# Patient Record
Sex: FEMALE | Race: WHITE
[De-identification: names, ages, dates, MRNs, and addresses within clinical notes are randomized per-mention and may not be internally consistent; named-entity substitution may affect disease eponyms.]

---

## 2017-10-19 ENCOUNTER — HOSPITAL ENCOUNTER (EMERGENCY)
Dept: HOSPITAL 92 - ERS | Age: 82
Discharge: HOME | End: 2017-10-19
Payer: MEDICARE

## 2017-10-19 DIAGNOSIS — Z79.891: ICD-10-CM

## 2017-10-19 DIAGNOSIS — M54.2: Primary | ICD-10-CM

## 2017-10-19 DIAGNOSIS — F32.9: ICD-10-CM

## 2017-10-19 DIAGNOSIS — E78.5: ICD-10-CM

## 2017-10-19 DIAGNOSIS — Z79.899: ICD-10-CM

## 2017-10-19 DIAGNOSIS — F41.9: ICD-10-CM

## 2017-10-19 DIAGNOSIS — Z79.82: ICD-10-CM

## 2017-10-19 DIAGNOSIS — I10: ICD-10-CM

## 2017-10-19 DIAGNOSIS — E03.9: ICD-10-CM

## 2017-10-19 DIAGNOSIS — G89.29: ICD-10-CM

## 2017-10-19 DIAGNOSIS — M54.5: ICD-10-CM

## 2017-10-19 PROCEDURE — 96375 TX/PRO/DX INJ NEW DRUG ADDON: CPT

## 2017-10-19 PROCEDURE — 96374 THER/PROPH/DIAG INJ IV PUSH: CPT

## 2017-10-19 PROCEDURE — 72131 CT LUMBAR SPINE W/O DYE: CPT

## 2017-10-19 PROCEDURE — 72125 CT NECK SPINE W/O DYE: CPT

## 2017-10-19 NOTE — CT
CT OF THE CERVICAL SPINE:

 

DATE: 10/19/17.

 

PROVIDED CLINICAL HISTORY: 

Acute on chronic neck pain.

 

FINDINGS: 

There is no evidence for a fracture or traumatic subluxation.  Advanced multilevel cervical degenera
tive changes are seen with areas of foraminal narrowing up to severe in degree.  Potentially severe 
central canal stenosis at C6-7 is noted.  No prevertebral soft tissue swelling apparent.  The visual
ized lung apices are free of significant opacity.

 

No concerning lytic or blastic bone lesions are seen.

 

IMPRESSION: 

1.  No evidence for a fracture or traumatic subluxation.

2.  Advanced multilevel cervical degenerative change.

 

POS: [facility]

## 2017-10-19 NOTE — CT
CT OF THE LUMBAR SPINE WITHOUT CONTRAST:

 

Date:  10/19/17 

 

COMPARISON:  

None. 

 

HISTORY:  

Lumbar spine. Prior spinal surgery. 

 

TECHNIQUE:  

Multiple contiguous axial images were obtained in a CT of the lumbar spine without contrast. Sagitta
l and coronal reformats were performed. 

 

FINDINGS:

The intervertebral discs are narrowed throughout the lumbar spine. Vacuum phenomenon is seen at mult
iple levels. The vertebral bodies demonstrate normal height without fracture or subluxation. The pat
ient is status posterior fusion of L4 through S1 with bilateral pedicle screws. No perihardware luce
ncy is seen surrounding the hardware. 

 

Atherosclerotic calcifications are seen in the aorta. The other prevertebral soft tissues are unrema
rkable. 

 

T12-L1: 

A small disc osteophyte complex is seen. Mild bilateral posterior disc arthrosis. No bony narrowing 
of the neural foramina or central canal. 

 

L1-2: 

A small disc osteophyte complex is seen. No posterior facet arthrosis. No bony narrowing of the cent
ral canal. Moderate bony narrowing of the bilateral neural foramina. 

 

L2-3: 

A moderate disc osteophyte complex is seen. Mild bilateral posterior facet arthrosis. No bony narrow
ing of the central canal. Mild right and moderate to severe left neural foraminal stenosis. 

 

L3-4: 

A small disc osteophyte complex is seen. Moderate bilateral posterior facet arthrosis. Mild bony dianna
rowing of the central canal. There is mild right neural foraminal stenosis. No left neural foraminal
 stenosis. 

 

L4-5: 

This level is fused. Moderate bilateral posterior facet arthrosis. No bony narrowing of the central 
canal. Moderate bilateral neural foraminal stenosis. 

 

L5-S1: 

A small disc osteophyte complex is seen. Moderate bilateral posterior facet arthrosis. This level is
 fused. No bony narrowing of the central canal. Moderate bilateral neural foraminal stenosis. 

 

IMPRESSION: 

Degenerative changes of the lumbar spine as above. 

 

 

POS: [facility]

## 2017-12-11 ENCOUNTER — HOSPITAL ENCOUNTER (EMERGENCY)
Dept: HOSPITAL 92 - ERS | Age: 82
LOS: 1 days | Discharge: PSYCHIATRIC OR SUBSTANCE USE FACILITY | End: 2017-12-12
Payer: MEDICARE

## 2017-12-11 DIAGNOSIS — R45.851: ICD-10-CM

## 2017-12-11 DIAGNOSIS — F32.9: ICD-10-CM

## 2017-12-11 DIAGNOSIS — Z79.82: ICD-10-CM

## 2017-12-11 DIAGNOSIS — E78.5: ICD-10-CM

## 2017-12-11 DIAGNOSIS — F41.9: Primary | ICD-10-CM

## 2017-12-11 DIAGNOSIS — I10: ICD-10-CM

## 2017-12-11 DIAGNOSIS — E03.9: ICD-10-CM

## 2017-12-11 DIAGNOSIS — Z79.899: ICD-10-CM

## 2017-12-11 DIAGNOSIS — Z86.711: ICD-10-CM

## 2017-12-11 LAB
ALP SERPL-CCNC: 86 U/L (ref 40–150)
ALT SERPL W P-5'-P-CCNC: 12 U/L (ref 8–55)
ANION GAP SERPL CALC-SCNC: 14 MMOL/L (ref 10–20)
APAP SERPL-MCNC: (no result) MCG/ML (ref 10–30)
AST SERPL-CCNC: 15 U/L (ref 5–34)
BACTERIA UR QL AUTO: (no result) HPF
BASOPHILS # BLD AUTO: 0.1 THOU/UL (ref 0–0.2)
BASOPHILS NFR BLD AUTO: 0.8 % (ref 0–1)
BILIRUB SERPL-MCNC: 0.5 MG/DL (ref 0.2–1.2)
BUN SERPL-MCNC: 18 MG/DL (ref 9.8–20.1)
CALCIUM SERPL-MCNC: 9.6 MG/DL (ref 7.8–10.44)
CHLORIDE SERPL-SCNC: 101 MMOL/L (ref 98–107)
CO2 SERPL-SCNC: 26 MMOL/L (ref 23–31)
CREAT CL PREDICTED SERPL C-G-VRATE: 0 ML/MIN (ref 70–130)
EOSINOPHIL # BLD AUTO: 0.2 THOU/UL (ref 0–0.7)
EOSINOPHIL NFR BLD AUTO: 1.8 % (ref 0–10)
GLOBULIN SER CALC-MCNC: 2.9 G/DL (ref 2.4–3.5)
HCT VFR BLD CALC: 40.8 % (ref 36–47)
HYALINE CASTS #/AREA URNS LPF: (no result) LPF
LYMPHOCYTES # BLD: 2.5 THOU/UL (ref 1.2–3.4)
LYMPHOCYTES NFR BLD AUTO: 22.4 % (ref 21–51)
MONOCYTES # BLD AUTO: 0.7 THOU/UL (ref 0.11–0.59)
MONOCYTES NFR BLD AUTO: 6.6 % (ref 0–10)
NEUTROPHILS # BLD AUTO: 7.7 THOU/UL (ref 1.4–6.5)
RBC # BLD AUTO: 4.7 MILL/UL (ref 4.2–5.4)
RBC UR QL AUTO: (no result) HPF (ref 0–3)
SALICYLATES SERPL-MCNC: (no result) MG/DL (ref 15–30)
WBC # BLD AUTO: 11.2 THOU/UL (ref 4.8–10.8)
WBC UR QL AUTO: (no result) HPF (ref 0–3)

## 2017-12-11 PROCEDURE — 81015 MICROSCOPIC EXAM OF URINE: CPT

## 2017-12-11 PROCEDURE — 80307 DRUG TEST PRSMV CHEM ANLYZR: CPT

## 2017-12-11 PROCEDURE — 80306 DRUG TEST PRSMV INSTRMNT: CPT

## 2017-12-11 PROCEDURE — 84443 ASSAY THYROID STIM HORMONE: CPT

## 2017-12-11 PROCEDURE — 93005 ELECTROCARDIOGRAM TRACING: CPT

## 2017-12-11 PROCEDURE — 36415 COLL VENOUS BLD VENIPUNCTURE: CPT

## 2017-12-11 PROCEDURE — 87086 URINE CULTURE/COLONY COUNT: CPT

## 2017-12-11 PROCEDURE — 82550 ASSAY OF CK (CPK): CPT

## 2017-12-11 PROCEDURE — 81003 URINALYSIS AUTO W/O SCOPE: CPT

## 2017-12-11 PROCEDURE — 85025 COMPLETE CBC W/AUTO DIFF WBC: CPT

## 2017-12-11 PROCEDURE — 80053 COMPREHEN METABOLIC PANEL: CPT

## 2017-12-12 NOTE — EKG
Test Reason : 

Blood Pressure : ***/*** mmHG

Vent. Rate : 071 BPM     Atrial Rate : 071 BPM

   P-R Int : 160 ms          QRS Dur : 096 ms

    QT Int : 390 ms       P-R-T Axes : 064 -46 126 degrees

   QTc Int : 423 ms

 

Normal sinus rhythm

Left anterior fascicular block

Nonspecific T wave abnormality

Abnormal ECG

 

Confirmed by GAURANG CARRERA ([id number]),  GOPI EDMOND ([id number] on 12/12/2017 1:27:21 PM

 

Referred By:             Confirmed By:GAURANG CARRERA

## 2018-02-19 ENCOUNTER — HOSPITAL ENCOUNTER (EMERGENCY)
Dept: HOSPITAL 92 - ERS | Age: 83
Discharge: HOME | End: 2018-02-19
Payer: MEDICARE

## 2018-02-19 DIAGNOSIS — E03.9: ICD-10-CM

## 2018-02-19 DIAGNOSIS — R55: Primary | ICD-10-CM

## 2018-02-19 DIAGNOSIS — K21.9: ICD-10-CM

## 2018-02-19 DIAGNOSIS — Z79.899: ICD-10-CM

## 2018-02-19 DIAGNOSIS — E78.5: ICD-10-CM

## 2018-02-19 DIAGNOSIS — F32.9: ICD-10-CM

## 2018-02-19 DIAGNOSIS — I10: ICD-10-CM

## 2018-02-19 DIAGNOSIS — G89.29: ICD-10-CM

## 2018-02-19 DIAGNOSIS — F41.9: ICD-10-CM

## 2018-02-19 LAB
ALBUMIN SERPL BCG-MCNC: 4.1 G/DL (ref 3.4–4.8)
ALP SERPL-CCNC: 83 U/L (ref 40–150)
ALT SERPL W P-5'-P-CCNC: 10 U/L (ref 8–55)
ANION GAP SERPL CALC-SCNC: 12 MMOL/L (ref 10–20)
AST SERPL-CCNC: 14 U/L (ref 5–34)
BASOPHILS # BLD AUTO: 0.1 THOU/UL (ref 0–0.2)
BASOPHILS NFR BLD AUTO: 1 % (ref 0–1)
BILIRUB SERPL-MCNC: 0.3 MG/DL (ref 0.2–1.2)
BUN SERPL-MCNC: 15 MG/DL (ref 9.8–20.1)
CALCIUM SERPL-MCNC: 9.5 MG/DL (ref 7.8–10.44)
CHLORIDE SERPL-SCNC: 98 MMOL/L (ref 98–107)
CK MB SERPL-MCNC: 0.8 NG/ML (ref 0–6.6)
CK SERPL-CCNC: 39 U/L (ref 29–168)
CO2 SERPL-SCNC: 31 MMOL/L (ref 23–31)
CREAT CL PREDICTED SERPL C-G-VRATE: 0 ML/MIN (ref 70–130)
EOSINOPHIL # BLD AUTO: 0.2 THOU/UL (ref 0–0.7)
EOSINOPHIL NFR BLD AUTO: 2.3 % (ref 0–10)
GLOBULIN SER CALC-MCNC: 2.6 G/DL (ref 2.4–3.5)
GLUCOSE SERPL-MCNC: 147 MG/DL (ref 83–110)
HGB BLD-MCNC: 13.1 G/DL (ref 12–16)
LYMPHOCYTES # BLD: 2.5 THOU/UL (ref 1.2–3.4)
LYMPHOCYTES NFR BLD AUTO: 27.8 % (ref 21–51)
MCH RBC QN AUTO: 30.3 PG (ref 27–31)
MCV RBC AUTO: 93.6 FL (ref 81–99)
MONOCYTES # BLD AUTO: 1 THOU/UL (ref 0.11–0.59)
MONOCYTES NFR BLD AUTO: 11.8 % (ref 0–10)
NEUTROPHILS # BLD AUTO: 5 THOU/UL (ref 1.4–6.5)
NEUTROPHILS NFR BLD AUTO: 57.2 % (ref 42–75)
PLATELET # BLD AUTO: 253 THOU/UL (ref 130–400)
POTASSIUM SERPL-SCNC: 3.5 MMOL/L (ref 3.5–5.1)
RBC # BLD AUTO: 4.33 MILL/UL (ref 4.2–5.4)
SODIUM SERPL-SCNC: 137 MMOL/L (ref 136–145)
TROPONIN I SERPL DL<=0.01 NG/ML-MCNC: (no result) NG/ML (ref ?–0.03)
WBC # BLD AUTO: 8.8 THOU/UL (ref 4.8–10.8)

## 2018-02-19 PROCEDURE — 93005 ELECTROCARDIOGRAM TRACING: CPT

## 2018-02-19 PROCEDURE — 85025 COMPLETE CBC W/AUTO DIFF WBC: CPT

## 2018-02-19 PROCEDURE — 71275 CT ANGIOGRAPHY CHEST: CPT

## 2018-02-19 PROCEDURE — 94760 N-INVAS EAR/PLS OXIMETRY 1: CPT

## 2018-02-19 PROCEDURE — 84484 ASSAY OF TROPONIN QUANT: CPT

## 2018-02-19 PROCEDURE — 82553 CREATINE MB FRACTION: CPT

## 2018-02-19 PROCEDURE — 85379 FIBRIN DEGRADATION QUANT: CPT

## 2018-02-19 PROCEDURE — 70450 CT HEAD/BRAIN W/O DYE: CPT

## 2018-02-19 PROCEDURE — 36415 COLL VENOUS BLD VENIPUNCTURE: CPT

## 2018-02-19 PROCEDURE — 80053 COMPREHEN METABOLIC PANEL: CPT

## 2018-02-19 PROCEDURE — 71045 X-RAY EXAM CHEST 1 VIEW: CPT

## 2018-02-19 NOTE — CT
CTA THORAX UTILIZING IV CONTRAST WITH PE PROTOCOL AND 3D REFORMATTED IMAGING:

 

INDICATION: 

Syncopal episode and collapsed at the [facility]. 

 

COMPARISON:  

Prior exam dated 03/27/17. 

 

FINDINGS: 

No central or segmental pulmonary defect is evident. There is a large hiatal hernia. There is bibasil
ar atelectasis. No confluent air space opacity, pleural effusion or pneumothorax is evident. 

 

No acute osseous abnormality is evident. Stable hypodensity within the posterior spleen, likely refle
cts splenic cyst. 

 

IMPRESSION: 

1. No central or segmental pulmonary embolus. 

2. No acute cardiopulmonary abnormality.

3. Prominent hiatal hernia with bibasilar atelectasis.  

 

POS: [facility]

## 2018-02-19 NOTE — RAD
UPRIGHT PORTABLE CHEST 1 VIEW:

 

Date:  02/19/18 

 

HISTORY:  

86-year-old female with history of syncope and collapse. 

 

COMPARISON:  

04/14/17. 

 

FINDINGS:

There is cardiomegaly. Evidence for at least a moderate size hiatal hernia. No evidence for pneumonia
. Minimal biapical pleural thickening. 

 

IMPRESSION: 

Stable appearing cardiomegaly and at least moderate size hiatal hernia, as well as some stable scatte
red chronic lung changes. No confluent pneumonia or other acute process. 

 

 

POS: [facility]

## 2018-02-19 NOTE — CT
CT OF THE BRAIN WITHOUT CONTRAST:

 

Date:  02/19/18 

 

INDICATION:

History of syncope and mild dizziness. 

 

COMPARISON: 

Prior exam dated 10/20/16. 

 

FINDINGS:

No acute infarct, hemorrhage, or hydrocephalus is present. Septum pellucidum and third ventricle are 
midline. Mild generalized cerebral atrophy is similar. Mild chronic small vessel which matter ischemi
c change is similar appearing. Mastoid air cells and paranasal sinuses are clear. 

 

IMPRESSION: 

No acute intracranial abnormality. 

 

 

 

POS: [facility]

## 2018-06-11 ENCOUNTER — HOSPITAL ENCOUNTER (OUTPATIENT)
Dept: HOSPITAL 92 - BICULT | Age: 83
Discharge: HOME | End: 2018-06-11
Attending: UROLOGY
Payer: MEDICARE

## 2018-06-11 DIAGNOSIS — N39.0: Primary | ICD-10-CM

## 2018-06-11 DIAGNOSIS — N39.41: ICD-10-CM

## 2018-06-11 DIAGNOSIS — N28.1: ICD-10-CM

## 2018-06-11 PROCEDURE — 76770 US EXAM ABDO BACK WALL COMP: CPT
